# Patient Record
Sex: MALE | Race: WHITE | NOT HISPANIC OR LATINO | URBAN - METROPOLITAN AREA
[De-identification: names, ages, dates, MRNs, and addresses within clinical notes are randomized per-mention and may not be internally consistent; named-entity substitution may affect disease eponyms.]

---

## 2025-02-10 ENCOUNTER — TELEPHONE (OUTPATIENT)
Dept: PRIMARY CARE | Facility: CLINIC | Age: 50
End: 2025-02-10
Payer: COMMERCIAL

## 2025-02-10 NOTE — TELEPHONE ENCOUNTER
Pt's mother called to schedule her son for an appt with you.  He is c/o rash for 2 days.  You have not seen him since Sept 2020.  Would you be willing to see patient?

## 2025-02-11 ENCOUNTER — TELEPHONE (OUTPATIENT)
Dept: PRIMARY CARE | Facility: CLINIC | Age: 50
End: 2025-02-11

## 2025-02-11 ENCOUNTER — OFFICE VISIT (OUTPATIENT)
Dept: PRIMARY CARE | Facility: CLINIC | Age: 50
End: 2025-02-11
Payer: COMMERCIAL

## 2025-02-11 VITALS — TEMPERATURE: 98.2 F | SYSTOLIC BLOOD PRESSURE: 127 MMHG | DIASTOLIC BLOOD PRESSURE: 93 MMHG | WEIGHT: 143.74 LBS

## 2025-02-11 DIAGNOSIS — L30.9 DERMATITIS: Primary | ICD-10-CM

## 2025-02-11 PROCEDURE — 99213 OFFICE O/P EST LOW 20 MIN: CPT | Performed by: FAMILY MEDICINE

## 2025-02-11 RX ORDER — MIRTAZAPINE 15 MG/1
15 TABLET, FILM COATED ORAL NIGHTLY
COMMUNITY
Start: 2025-02-10

## 2025-02-11 RX ORDER — LAMOTRIGINE 200 MG/1
200 TABLET ORAL DAILY
COMMUNITY
Start: 2020-05-28

## 2025-02-11 RX ORDER — METHYLPREDNISOLONE 4 MG/1
TABLET ORAL
Qty: 21 TABLET | Refills: 0 | Status: SHIPPED | OUTPATIENT
Start: 2025-02-11 | End: 2025-02-17

## 2025-02-11 RX ORDER — LURASIDONE HYDROCHLORIDE 80 MG/1
80 TABLET, FILM COATED ORAL
COMMUNITY
Start: 2025-02-10

## 2025-02-11 NOTE — PROGRESS NOTES
Subjective   Patient ID: [id number]     Ishaan May is a 49 y.o. male who presents for Rash (Rash on arms & back that started 2 weeks ago and has gotten worse the last 2 days).    HPI  Two weeks of a rash on his arms and face that is new;  he has gotten the rash on his chest in the past with stress that would go away within a week or two;  no change in tide pods and no fabric softener sheets  Recently had to move his mother into a nursing home and that has been very stressful to him.  [facility] in [facility] Will Tellez MD    Review of Systems  ENT-no sore throat  ID-no fever  ENDO- No change in hair, voice, skin, weight or temperature tolerance   MSK-No locking, giving way/swelling of joints  NEURO- No daily headaches, hx of concussion, fall or seizure in the last year   DERM-No rashes, blanching or  change in any moles     Objective     BP (!) 127/93 (BP Location: Left arm, Patient Position: Sitting)   Temp 36.8 °C (98.2 °F) (Oral)   Wt 65.2 kg (143 lb 11.8 oz)      Physical Exam  Skin- red scaly rash on face and arms, sparing anterior chest;  large patches on back however  Neck-supple without lymphadenopathy or thyromegaly; no carotid bruits  Heart- regular rate and rhythm, normal s1 and s2 without murmur or gallop  Lungs-clear to auscultation  Abdomen-soft, positive bowel sounds, without masses, HSmegaly or pain     Assessment/Plan     Problem List Items Addressed This Visit    None  Visit Diagnoses       Dermatitis    -  Primary    Relevant Medications    methylPREDNISolone (Medrol Dospak) 4 mg tablets    Other Relevant Orders    Referral to Dermatology          I will call Dr Tellez to discuss possible photosensitivity of Lamictal or Latuda.       The Medrol dosepak will help but you have an underlying skin problem that may require biopsies.  Use the Aquaphor twice daily ideally after a shower to keep yourr skin hydrated in themeantime.  Call for any worsening.    You will need to see a dermatologist to rule out  guttate psoriasis.    Quitting smoking is the single most important thing you can do for your health.  We can help.         Leonel Franklin MD

## 2025-02-11 NOTE — PATIENT INSTRUCTIONS
I will call Dr Tellez to discuss possible photosensitivity of Lamictal or Latuda.       The Medrol dosepak will help but you have an underlying skin problem that may require biopsies.  Use the Aquaphor twice daily ideally after a shower to keep yourr skin hydrated in themeantime.  Call for any worsening.    You will need to see a dermatologist to rule out guttate psoriasis.    Quitting smoking is the single most important thing you can do for your health.  We can help.

## 2025-02-11 NOTE — TELEPHONE ENCOUNTER
Left message on voicemail at [employer] for Will Khan (psych nurse practitioner) to call our office  Ph# [phone]

## 2025-04-09 PROBLEM — Z00.00 HEALTH CARE MAINTENANCE: Status: ACTIVE | Noted: 2025-04-09

## 2025-04-09 NOTE — PROGRESS NOTES
"Subjective   Patient ID: [id number]     Ishaan May is a 49 y.o. male who presents for Annual Exam.    HPI  Ishaan is here for a general phsycial  Patient defers on HIV screening and prevnar  He bought a bike for exercise.    Review of Systems  General-denies weakness or myalgias;  no unexplained fever or chills  Cardiovascular- No chest pain or pressure, nausea, diaphoresis, paresthesias, dizziness, or syncope with or without exertion  Gastrointestinal-No blood in stool, tarry stools, pain, vomiting, heartburn, constipation or diarrhea  Pulmonary-No wheezing, or shortness of breath;  but has occasional coughing in the morning  Psychiatric-No complaints regarding libido, appetite, memory or concentration;  no drug use or alcohol usage over six per week  Endocrinology- No change in hair, voice, skin, weight or temperature tolerance   Sleep-No complaints of insomnia, apnea or restless legs; no gasping   Urology-No frequency, urgency, blood in urine, or incontinence; nocturia times three  Musculoskeletal-No locking, giving way/swelling of joints;  stiffness occasionally in his hands  Neurology- No daily headaches, hx of concussion, falls in the last year or seizure  Allergy/Immunology-No history of sneezing   Dermatology-No  blanching or  change in any moles;  eczema can be triggered by stress or hot showers  ENT-No hearing loss, tinnitus or vertigo  OPTH- has blurry vision    Objective     /90 (BP Location: Right arm, Patient Position: Sitting)   Temp 37 °C (98.6 °F) (Oral)   Ht 1.803 m (5' 11\")   Wt 63.2 kg (139 lb 5.3 oz)   BMI 19.43 kg/m²      Physical Exam  Eyes-pupils equal round, reactive to light and accommodation, fundi with normal cup/disc ratio, conjunctiva without redness or discharge  General- well defined, well nourished, well hydrated individual in NAD  Skin- normal color and turgor; without nail pitting  Head-normocephalic without masses or tenderness  Ears-normal  canals, with normal " landmarks and light reflex of tympanic membranes bilaterally;  has ulcer in left auricls;  diffuse eczema  Nose-septum in the midline, normal mucosa bilaterally  Throat- without erythema or exudate, uvula in midline  Neck-supple without lymphadenopathy or thyromegaly; no carotid bruits  Heart- regular rate and rhythm, normal s1 and s2 without murmur or gallop  Lungs-clear to auscultation  Abdomen-soft, positive bowel sounds, without masses, HSmegaly or pain   Extremities- without cords, cyanosis, clubbing or edema;  no nodes/joint swelling  Back-without CVA or spine tenderness  Rectal- normal ampulla and anus; hemetest negative; prostate smooth, normal size, and symmetrical    Assessment/Plan     Problem List Items Addressed This Visit       Health care maintenance - Primary    Relevant Orders    Lipid Panel    Hepatitis C Antibody    Colonoscopy Screening; Average Risk Patient    Bipolar 1 disorder (Multi)    Relevant Orders    Comprehensive Metabolic Panel    Thyroid Stimulating Hormone    Malignant melanoma of skin of scalp and neck (Multi)    Relevant Orders    Referral to Dermatology     Other Visit Diagnoses       Smoker        Relevant Orders    CT lung screening low dose    Nocturia        Relevant Orders    Prostate Specific Antigen    POCT UA Automated manually resulted          Remember  to see your melanoma provider for a complete skin check at least twice yearly.    Quitting smoking is the single most important thing you can do for your health.  We can help.     Please call if you have any questions regarding the Living Will and/or the Durable Power of  for Healthcare.  If you decide to proceed, get your signature notarized and provide us a copy for your file.     Your dental health affects your overall health and as such you should see the dentist at least every six months.      See your eye doctor at least every two years to screen for the gradual loss of vision that can occur with  Glaucoma.    Exercising for 30' three times weekly, at an intensity that makes it difficult to say The Pledge of Allegiance on one breath, is a good basic exercise program    You are eligible for the COVID booster.  Without a recent (within 90 days) challenge (booster or infection) your immune system will be three days behind in protecting you from the infection.  You will infect approximately five people by that time.    Follow up fasting (no alcohol for 48 hours and just water for 14 hours) in six months for your next routine appointment.  In general, take any medications on schedule (except for types of Insulin).      Leonel Franklin MD

## 2025-04-09 NOTE — PATIENT INSTRUCTIONS
Remember  to see your melanoma provider for a complete skin check at least twice yearly.    Quitting smoking is the single most important thing you can do for your health.  We can help.     Please call if you have any questions regarding the Living Will and/or the Durable Power of  for Healthcare.  If you decide to proceed, get your signature notarized and provide us a copy for your file.     Your dental health affects your overall health and as such you should see the dentist at least every six months.      See your eye doctor at least every two years to screen for the gradual loss of vision that can occur with Glaucoma.    Exercising for 30' three times weekly, at an intensity that makes it difficult to say The Pledge of Allegiance on one breath, is a good basic exercise program    You are eligible for the COVID booster.  Without a recent (within 90 days) challenge (booster or infection) your immune system will be three days behind in protecting you from the infection.  You will infect approximately five people by that time.    Follow up fasting (no alcohol for 48 hours and just water for 14 hours) in six months for your next routine appointment.  In general, take any medications on schedule (except for types of Insulin).

## 2025-04-10 ENCOUNTER — APPOINTMENT (OUTPATIENT)
Dept: PRIMARY CARE | Facility: CLINIC | Age: 50
End: 2025-04-10
Payer: COMMERCIAL

## 2025-04-10 VITALS
TEMPERATURE: 98.6 F | DIASTOLIC BLOOD PRESSURE: 74 MMHG | BODY MASS INDEX: 19.51 KG/M2 | WEIGHT: 139.33 LBS | SYSTOLIC BLOOD PRESSURE: 136 MMHG | HEIGHT: 71 IN

## 2025-04-10 DIAGNOSIS — F31.9 BIPOLAR 1 DISORDER (MULTI): ICD-10-CM

## 2025-04-10 DIAGNOSIS — C43.4 MALIGNANT MELANOMA OF SKIN OF SCALP AND NECK (MULTI): ICD-10-CM

## 2025-04-10 DIAGNOSIS — R35.1 NOCTURIA: ICD-10-CM

## 2025-04-10 DIAGNOSIS — Z00.00 HEALTH CARE MAINTENANCE: Primary | ICD-10-CM

## 2025-04-10 DIAGNOSIS — F17.200 SMOKER: ICD-10-CM

## 2025-04-10 PROCEDURE — 3008F BODY MASS INDEX DOCD: CPT | Performed by: FAMILY MEDICINE

## 2025-04-10 PROCEDURE — 99396 PREV VISIT EST AGE 40-64: CPT | Performed by: FAMILY MEDICINE

## 2025-04-10 RX ORDER — CLOBETASOL PROPIONATE 0.5 MG/G
CREAM TOPICAL
COMMUNITY
Start: 2025-03-31

## 2025-04-10 RX ORDER — TRAZODONE HYDROCHLORIDE 50 MG/1
50 TABLET ORAL NIGHTLY PRN
COMMUNITY

## 2025-04-10 RX ORDER — HYDROCORTISONE 25 MG/G
CREAM TOPICAL
COMMUNITY
Start: 2025-03-31

## 2025-04-12 LAB
ALBUMIN SERPL-MCNC: 4.8 G/DL (ref 3.6–5.1)
ALP SERPL-CCNC: 53 U/L (ref 35–144)
ALT SERPL-CCNC: 30 U/L (ref 9–46)
ANION GAP SERPL CALCULATED.4IONS-SCNC: 16 MMOL/L (CALC) (ref 7–17)
AST SERPL-CCNC: 32 U/L (ref 10–35)
BILIRUB SERPL-MCNC: 0.7 MG/DL (ref 0.2–1.2)
BUN SERPL-MCNC: 9 MG/DL (ref 7–25)
CALCIUM SERPL-MCNC: 9.9 MG/DL (ref 8.6–10.3)
CHLORIDE SERPL-SCNC: 92 MMOL/L (ref 98–110)
CHOLEST SERPL-MCNC: 257 MG/DL
CHOLEST/HDLC SERPL: 3.2 (CALC)
CO2 SERPL-SCNC: 22 MMOL/L (ref 20–32)
CREAT SERPL-MCNC: 0.58 MG/DL (ref 0.7–1.3)
EGFRCR SERPLBLD CKD-EPI 2021: 119 ML/MIN/1.73M2
GLUCOSE SERPL-MCNC: 91 MG/DL (ref 65–99)
HCV AB SERPL QL IA: NORMAL
HDLC SERPL-MCNC: 81 MG/DL
LDLC SERPL CALC-MCNC: 150 MG/DL (CALC)
NONHDLC SERPL-MCNC: 176 MG/DL (CALC)
POTASSIUM SERPL-SCNC: 4.9 MMOL/L (ref 3.5–5.3)
PROT SERPL-MCNC: 7.6 G/DL (ref 6.1–8.1)
SODIUM SERPL-SCNC: 130 MMOL/L (ref 135–146)
TRIGL SERPL-MCNC: 137 MG/DL
TSH SERPL-ACNC: 1.65 MIU/L (ref 0.4–4.5)

## 2025-04-15 DIAGNOSIS — E87.1 HYPONATREMIA: Primary | ICD-10-CM

## 2025-04-15 PROBLEM — E78.5 HYPERLIPIDEMIA: Status: ACTIVE | Noted: 2025-04-15

## 2025-07-23 ENCOUNTER — APPOINTMENT (OUTPATIENT)
Dept: RADIOLOGY | Facility: CLINIC | Age: 50
End: 2025-07-23
Payer: COMMERCIAL